# Patient Record
Sex: FEMALE | Race: WHITE
[De-identification: names, ages, dates, MRNs, and addresses within clinical notes are randomized per-mention and may not be internally consistent; named-entity substitution may affect disease eponyms.]

---

## 2020-11-24 ENCOUNTER — HOSPITAL ENCOUNTER (EMERGENCY)
Dept: HOSPITAL 76 - ED | Age: 40
Discharge: HOME | End: 2020-11-24
Payer: COMMERCIAL

## 2020-11-24 VITALS — SYSTOLIC BLOOD PRESSURE: 122 MMHG | DIASTOLIC BLOOD PRESSURE: 66 MMHG

## 2020-11-24 DIAGNOSIS — M25.572: Primary | ICD-10-CM

## 2020-11-24 DIAGNOSIS — M25.571: ICD-10-CM

## 2020-11-24 PROCEDURE — 99283 EMERGENCY DEPT VISIT LOW MDM: CPT

## 2020-11-24 NOTE — XRAY REPORT
PROCEDURE:  Ankle 3 View RT

 

INDICATIONS:  inversion injury bilaterally; pain medially

 

TECHNIQUE:  3 views of the ankle were acquired.  

 

COMPARISON:  None

 

FINDINGS:  

 

Bones:  No fractures or dislocations.  Ankle mortise is normally aligned.  No suspicious bony lesions
.  

 

Soft tissues:  No tibiotalar joint effusion.  Achilles tendon appears normal.  

 

 

IMPRESSION:  

 

No fracture. No osseous lesion. If there is continued clinical concern for pathology, then repeat neptali
in film radiographs (7-10 days) or advanced imaging (CT, MR, bone scan) should be considered for furt
her evaluation.

 

Reviewed by: Traci Diop MD, PhD on 11/24/2020 1:44 PM PST

Approved by: Traci Diop MD, PhD on 11/24/2020 1:44 PM PST

 

 

Station ID:  [id number]

## 2020-11-24 NOTE — XRAY REPORT
PROCEDURE:  Ankle 3 View LT

 

INDICATIONS:  inversion injury bilaterally.  pain medially

 

TECHNIQUE:  3 views of the ankle were acquired.  

 

COMPARISON:  None

 

FINDINGS:  

 

Bones:  No fractures or dislocations.  Ankle mortise is normally aligned.  No suspicious bony lesions
.  

 

Soft tissues:  No tibiotalar joint effusion.  Achilles tendon appears normal.  

 

 

IMPRESSION:  

 

No fracture. No osseous lesion. If there is continued clinical concern for pathology, then repeat neptali
in film radiographs (7-10 days) or advanced imaging (CT, MR, bone scan) should be considered for furt
her evaluation.

 

Reviewed by: Traci Diop MD, PhD on 11/24/2020 1:45 PM PST

Approved by: Traci Diop MD, PhD on 11/24/2020 1:45 PM PST

 

 

Station ID:  [id number]

## 2020-11-24 NOTE — ED PHYSICIAN DOCUMENTATION
PD HPI LOWER EXT INJURY





- Stated complaint


Stated Complaint: ANKLE PX/SWELLING





- Chief complaint


Chief Complaint: Ext Problem





- Additional information


Additional information: 


40-year-old female presents to the emergency department for evaluation of 

bilateral medial ankle pain.  She reports that 2 days ago she was walking and 

inversion injury occurred in both of her ankles. Since then she has had pain 

with weightbearing left greater than right on the medial side of both ankles.  

No history of previous injury.  She has been applying Ace wrap and taking 

ibuprofen with mild relief of pain.








Review of Systems


Constitutional: reports: Reviewed and negative


Eyes: reports: Reviewed and negative


Nose: reports: Reviewed and negative


Cardiac: reports: Reviewed and negative


Respiratory: reports: Reviewed and negative


GI: reports: Reviewed and negative


: reports: Reviewed and negative


Skin: reports: Reviewed and negative


Musculoskeletal: reports: Joint pain (bilateral ankles)


Neurologic: denies: Generalized weakness, Numbness, Difficulty speaking, Near 

syncope, Syncope, Seizure, Confused





PD PAST MEDICAL HISTORY





- Past Medical History


Past Medical History: Yes


Cardiovascular: None


Respiratory: None


Neuro: None


Endocrine/Autoimmune: None


GI: None


GYN: None


: None


HEENT: Chronic vision loss


Psych: None


Musculoskeletal: None


Derm: None





- Past Surgical History


Past Surgical History: Yes


/GYN: Tubal ligation





- Present Medications


Home Medications: 


                                Ambulatory Orders











 Medication  Instructions  Recorded  Confirmed


 


Mirtazapine [Remeron] 15 mg PO 11/24/20 


 


Sertraline [Zoloft] 50 mg PO 11/24/20 














- Allergies


Allergies/Adverse Reactions: 


                                    Allergies











Allergy/AdvReac Type Severity Reaction Status Date / Time


 


No Known Drug Allergies Allergy   Verified 11/24/20 12:34














- Social History


Does the pt smoke?: No


Smoking Status: Never smoker


Does the pt drink ETOH?: No


Does the pt have substance abuse?: No





- Immunizations


Immunizations are current?: Yes





- POLST


Patient has POLST: No





PD ED PE EXPANDED





- Extremities


Extremities: Right ankle (pain medial joint, normal gair right foot.  full ROM. 

 no swelling), Left ankle (tenderness medial joint and pain with weight bearing.

  No reduced ROM.  bears partial weight)





Results





- Vitals


Vitals: 


                               Vital Signs - 24 hr











  11/24/20





  12:28


 


Temperature 36.9 C


 


Heart Rate 71


 


Respiratory 16





Rate 


 


Blood Pressure 147/55 H


 


O2 Saturation 98








                                     Oxygen











O2 Source                      Room air

















- Rads (name of study)


  ** right ankle 


Radiology: Final report received (No acute fracture or dislocation)





  ** left ankle


Radiology: Final report received (No acute fracture or dislocation)





PD MEDICAL DECISION MAKING





- ED course


Complexity details: re-evaluated patient, considered differential, d/w patient


ED course: 


40-year-old female presents the emergency department for evaluation of bilateral

 medial ankle pain sustained 2 days ago when she inverted both of her ankles.  

No history of previous injury.  She has left greater than right ankle pain. X-

ray of both ankles shows no acute osseous abnormality.  Patient is able to bear 

full weight on the right foot as well as nearly full weight on the left.  She 

remains in an Ace wrap.  I will recommend crutches for suspected ankle sprain 

and ice as well as NSAID at home.  Emergent return precautions discussed








Departure





- Departure


Disposition: 01 Home, Self Care


Clinical Impression: 


 Bilateral ankle joint pain





Condition: Stable


Record reviewed to determine appropriate education?: Yes


Instructions:  Ankle Sprain


Comments: 


The x-rays of both your ankles are negative.  We do not see any fractures or 

dislocations.  You have most likely sprained both the ankles though the left is 

worse than the right.  I do recommend that you continue to wear the Ace wrap 

when you are out of bed during the day.  Use the crutches to remain as non-

weightbearing on the left ankle as you can.  Continue take Tylenol or ibuprofen 

at home for pain.